# Patient Record
Sex: MALE | Race: WHITE | NOT HISPANIC OR LATINO | URBAN - METROPOLITAN AREA
[De-identification: names, ages, dates, MRNs, and addresses within clinical notes are randomized per-mention and may not be internally consistent; named-entity substitution may affect disease eponyms.]

---

## 2018-04-25 ENCOUNTER — EMERGENCY (EMERGENCY)
Facility: HOSPITAL | Age: 38
LOS: 1 days | Discharge: HOME | End: 2018-04-25
Admitting: EMERGENCY MEDICINE
Payer: COMMERCIAL

## 2018-04-25 VITALS
RESPIRATION RATE: 18 BRPM | SYSTOLIC BLOOD PRESSURE: 119 MMHG | DIASTOLIC BLOOD PRESSURE: 84 MMHG | HEART RATE: 84 BPM | OXYGEN SATURATION: 97 % | TEMPERATURE: 98 F
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
## 2018-04-25 DIAGNOSIS — M79.601 PAIN IN RIGHT ARM: ICD-10-CM

## 2018-04-25 DIAGNOSIS — M79.642 PAIN IN LEFT HAND: ICD-10-CM
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
## 2018-04-25 DIAGNOSIS — Z23 ENCOUNTER FOR IMMUNIZATION: ICD-10-CM
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
## 2018-04-25 PROCEDURE — 73564 X-RAY EXAM KNEE 4 OR MORE: CPT | Mod: 26,RT

## 2018-04-25 PROCEDURE — 73130 X-RAY EXAM OF HAND: CPT | Mod: 26,LT

## 2018-04-25 PROCEDURE — 99283 EMERGENCY DEPT VISIT LOW MDM: CPT

## 2018-04-25 PROCEDURE — 73080 X-RAY EXAM OF ELBOW: CPT | Mod: 26,RT

## 2018-04-25 RX ORDER — TETANUS TOXOID, REDUCED DIPHTHERIA TOXOID AND ACELLULAR PERTUSSIS VACCINE, ADSORBED 5; 2.5; 8; 8; 2.5 [IU]/.5ML; [IU]/.5ML; UG/.5ML; UG/.5ML; UG/.5ML
0.5 SUSPENSION INTRAMUSCULAR ONCE
Qty: 0 | Refills: 0 | Status: COMPLETED | OUTPATIENT
Start: 2018-04-25 | End: 2018-04-25

## 2018-04-25 RX ADMIN — TETANUS TOXOID, REDUCED DIPHTHERIA TOXOID AND ACELLULAR PERTUSSIS VACCINE, ADSORBED 0.5 MILLILITER(S): 5; 2.5; 8; 8; 2.5 SUSPENSION INTRAMUSCULAR at 20:30

## 2018-04-25 NOTE — ED PROVIDER NOTE - PHYSICAL EXAMINATION
R knee/R elbow: no erythema, edema, warmth, tenderness or sign of trauma. FROM, 5/5 strength, +2 pulses, sensation intact  L hand: no erythema, edema, warmth, tenderness or sign of trauma. FROM, 5/5 hand , finger warm and pink, <2 sec capillary refill ,+2 pulses, sensation intact    VITAL SIGNS: I have reviewed nursing notes and confirm.  CONSTITUTIONAL: Well-developed; well-nourished; in no acute distress.  SKIN: Skin is warm and dry, no acute rash.  HEAD: Normocephalic; atraumatic.  EYES: PERRL, EOM intact; conjunctiva and sclera clear.  ENT: No nasal discharge; airway clear.  NECK: Supple; non tender.  CARD: S1, S2 normal; no murmurs, gallops, or rubs. Regular rate and rhythm.  RESP: No wheezes, rales or rhonchi.  ABD: Normal bowel sounds; soft; non-distended; non-tender; no hepatosplenomegaly.  ALL OTHER EXT: Normal ROM. No clubbing, cyanosis or edema.  NEURO: Alert, oriented. Grossly unremarkable.  PSYCH: Cooperative, appropriate.
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
## 2018-04-25 NOTE — ED ADULT NURSE NOTE - CHPI ED SYMPTOMS NEG
no difficulty bearing weight/no headache/no crying/no decreased eating/drinking/no bruising/no dizziness/no disorientation/no fussiness

## 2020-12-08 ENCOUNTER — APPOINTMENT (OUTPATIENT)
Dept: OTOLARYNGOLOGY | Facility: CLINIC | Age: 40
End: 2020-12-08

## 2020-12-08 ENCOUNTER — APPOINTMENT (OUTPATIENT)
Dept: OTOLARYNGOLOGY | Facility: CLINIC | Age: 40
End: 2020-12-08
Payer: COMMERCIAL

## 2020-12-08 VITALS
SYSTOLIC BLOOD PRESSURE: 137 MMHG | WEIGHT: 189 LBS | HEART RATE: 71 BPM | HEIGHT: 67 IN | DIASTOLIC BLOOD PRESSURE: 87 MMHG | BODY MASS INDEX: 29.66 KG/M2 | TEMPERATURE: 97 F

## 2020-12-08 DIAGNOSIS — H61.21 IMPACTED CERUMEN, RIGHT EAR: ICD-10-CM

## 2020-12-08 PROBLEM — Z00.00 ENCOUNTER FOR PREVENTIVE HEALTH EXAMINATION: Status: ACTIVE | Noted: 2020-12-08

## 2020-12-08 PROCEDURE — 99204 OFFICE O/P NEW MOD 45 MIN: CPT | Mod: 25

## 2020-12-08 PROCEDURE — 69210 REMOVE IMPACTED EAR WAX UNI: CPT

## 2020-12-08 PROCEDURE — 92557 COMPREHENSIVE HEARING TEST: CPT

## 2020-12-08 PROCEDURE — 92550 TYMPANOMETRY & REFLEX THRESH: CPT

## 2020-12-08 PROCEDURE — 99072 ADDL SUPL MATRL&STAF TM PHE: CPT

## 2020-12-08 NOTE — PHYSICAL EXAM
[FreeTextEntry1] : Ad: EAC w thick cerumen removed w angled curet. TM intact and mobile, ME clear\par As: EAC clear and dry. TM intact, thickened and opaque, immobile. Cannot see into ME. [Midline] : trachea located in midline position [Normal] : no rashes

## 2020-12-08 NOTE — ASSESSMENT
[FreeTextEntry1] : 40M here for initial evaluation. For the past 6-7 years, he c/o constellation of recurrent sx involving his left ear which include sensation there is water stuck with clogged ears, pressure and diminished hearing. These vary in severity. Two weeks ago he had bloody drainage from the left ear which got better. He has been treated in the past with various oral and topical antibiotics with mild improvement in sx. Audiogram from today shows normal right sided hearing and a mild left conductive hearing loss. On exam, cerumen was removed from the right EAC. The left TM is intact, thickened and opaque and immobile. The rest of the head and neck exam is unremarkable.\par He has chronic left otitis media and mild conductive hearing loss on audiometry at all frequencies. Will send for CT temporal bone as next step and RTO thereafter to discuss plan.

## 2020-12-08 NOTE — HISTORY OF PRESENT ILLNESS
[de-identified] : 40M here for initial evaluation.\par \par For the past 6-7 years, he c/o constellation of recurrent sx involving his left ear which include sensation there is water stuck with clogged ears, pressure and diminished hearing. These vary in severity. Two weeks ago he had bloody drainage from the left ear which got better. He has been treated in the past with various oral and topical antibiotics with mild improvement in sx.\par \par Audiogram from today:\par R ear: hearing wnl. SRT 20, 100% discrim, type A \par L ear: mild CHL. SRT 40, 100% discrim, type A\par \par ROS otherwise unremarkable.

## 2020-12-24 ENCOUNTER — APPOINTMENT (OUTPATIENT)
Dept: CT IMAGING | Facility: HOSPITAL | Age: 40
End: 2020-12-24

## 2021-01-05 ENCOUNTER — APPOINTMENT (OUTPATIENT)
Dept: OTOLARYNGOLOGY | Facility: CLINIC | Age: 41
End: 2021-01-05

## 2021-02-23 ENCOUNTER — APPOINTMENT (OUTPATIENT)
Dept: OTOLARYNGOLOGY | Facility: CLINIC | Age: 41
End: 2021-02-23
Payer: COMMERCIAL

## 2021-02-23 PROCEDURE — 99072 ADDL SUPL MATRL&STAF TM PHE: CPT

## 2021-02-23 PROCEDURE — 99213 OFFICE O/P EST LOW 20 MIN: CPT

## 2021-02-23 NOTE — ASSESSMENT
[FreeTextEntry1] : 40M here in followup. For the past 6-7 years, he c/o constellation of recurrent sx involving his left ear which include sensation there is water stuck with clogged ears, pressure and diminished hearing. These vary in severity. He has also had rare episodes of bloody drainage. He has been treated in the past with various oral and topical antibiotics with mild improvement in sx.  Audiogram shows normal right sided hearing and a mild left conductive hearing loss. CT temporal bone shows abnormal soft tissue filling the left middle ear cavity w ossicular erosion and mastoid opacification and is c/w cholesteatoma. On exam, there is mucoid drainage in the left EAC which was suctioned; there is area of the posterior EAC w erosion and keratinous debris extending towards the attic area w retraction and bony erosion; the TM anteromedially is intact, but ME is opacified.\par He likely has chronic left otitis media with cholesteatoma with mild conductive hearing loss on audiometry at all frequencies. I will send to my otology colleague Dr. Ding for definitive management. Pt instructed to bring the CD with his images to his appointment.

## 2021-02-23 NOTE — PHYSICAL EXAM
[Midline] : trachea located in midline position [Normal] : no rashes [FreeTextEntry1] : Ad: EAC clear. TM intact and mobile, ME clear\par As: EAC w mucoid drainage suctioned; area of posterior canal w erosion and keratinous debris extending towards attic area; TM anteromedially intact, but ME opacified

## 2021-02-23 NOTE — HISTORY OF PRESENT ILLNESS
[de-identified] : 40M here in followup.\par \par For the past 6-7 years, he c/o constellation of recurrent sx involving his left ear which include sensation there is water stuck with clogged ears, pressure and diminished hearing. These vary in severity. He has also had rare episodes of bloody drainage. He has been treated in the past with various oral and topical antibiotics with mild improvement in sx.\par \par Audiogram from 12/2020:\par R ear: hearing wnl. SRT 20, 100% discrim, type A \par L ear: mild CHL. SRT 40, 100% discrim, type A\par \par CT temporal bone 2/9/21:\par -abnormal soft tissue filling middle ear cavity w ossicular erosion and mastoid opacification\par \par ROS otherwise unremarkable.

## 2021-02-28 ENCOUNTER — NON-APPOINTMENT (OUTPATIENT)
Age: 41
End: 2021-02-28

## 2021-03-01 ENCOUNTER — APPOINTMENT (OUTPATIENT)
Dept: OTOLARYNGOLOGY | Facility: CLINIC | Age: 41
End: 2021-03-01
Payer: COMMERCIAL

## 2021-03-01 VITALS — HEIGHT: 67 IN | TEMPERATURE: 98 F | WEIGHT: 188 LBS | BODY MASS INDEX: 29.51 KG/M2

## 2021-03-01 DIAGNOSIS — Z78.9 OTHER SPECIFIED HEALTH STATUS: ICD-10-CM

## 2021-03-01 DIAGNOSIS — F17.200 NICOTINE DEPENDENCE, UNSPECIFIED, UNCOMPLICATED: ICD-10-CM

## 2021-03-01 PROCEDURE — 99072 ADDL SUPL MATRL&STAF TM PHE: CPT

## 2021-03-01 PROCEDURE — 31231 NASAL ENDOSCOPY DX: CPT

## 2021-03-01 PROCEDURE — 69210 REMOVE IMPACTED EAR WAX UNI: CPT

## 2021-03-01 PROCEDURE — 99215 OFFICE O/P EST HI 40 MIN: CPT | Mod: 25

## 2021-03-01 NOTE — PROCEDURE
[FreeTextEntry6] : PROCEDURE:  NASAL ENDOSCOPY  \par \par Surgeon: Dr. Ding\par Indication: Chronic Rhinitis\par Anesthetic: Topical lidocaine and Afrin\par Procedure: The patient was placed in a sitting position.  Following application of the topical anesthetic and decongestant, exam was performed with a flexible endoscope.  The following anatomic sites were directly examined bilaterally in a sequential fashion:\par The scope was introduced in the nasal passage between the middle and inferior turbinates to exam the inferior portion of the middle meatus and the fontanelle, as well as the maxillary ostia. Next, the scope was passed medially and posteriorly to the middle turbinates to examine the sphenoethmoid recess and the superior turbinate region.\par \par Nasopharynx: no masses, choanae patent, no adenoid tissue\par \par The following findings were noted:\par \par mild turbinate hypertrophy. No nasopharyngeal mass. The eustachian tube orifice was patent.

## 2021-03-01 NOTE — ASSESSMENT
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 CT images suggest imminent threat to the middle cranial fossa, the semicircular canal, and the facial nerve. I have reviewed this with the patient in detail.\par \par I have recommended surgical intervention. Management options carefully reviewed in detail.All risks limitations, complications and alternatives reviewed in detail.  Questions answered.\par \par Surgical plan: Left tympanomastoidectomy, cartilage graft, possible canal down, skin graft, facial nerve decompression

## 2021-03-01 NOTE — PHYSICAL EXAM
[FreeTextEntry1] : Microscopic ear exam with cerumen debridement:\par \par Right ear: The ear canal was patent and nonobstructed.  The tympanic membrane was intact and noninflamed.\par \par Left ear: Obstructing cerumen was debrided from the ear canal using suction.  The ear canal was within normal limits. Pars flaccida retraction with purulence and keratin retention debrided with suction. A posterior superior quadrant retraction is also noted. [Nasal Endoscopy Performed] : nasal endoscopy was performed, see procedure section for findings [Midline] : trachea located in midline position [Normal] : no rashes

## 2021-03-01 NOTE — HISTORY OF PRESENT ILLNESS
[de-identified] : HIREN CHEATHAM has a history of hearing loss in the left ear for several years.  Ear fullness and hearing loss since 2015.  Intermittent otorrhea since about 2019. This has increased.  No significant pain.  No vertigo.\par \par No prior ear disease.  Denies sino nasal disease.

## 2021-03-01 NOTE — DATA REVIEWED
[de-identified] : audiometry from December reviewed with the patient in detail [de-identified] : CT scan images reviewed with the patient in detail

## 2021-03-23 ENCOUNTER — LABORATORY RESULT (OUTPATIENT)
Age: 41
End: 2021-03-23

## 2021-03-25 ENCOUNTER — TRANSCRIPTION ENCOUNTER (OUTPATIENT)
Age: 41
End: 2021-03-25

## 2021-03-26 ENCOUNTER — RESULT REVIEW (OUTPATIENT)
Age: 41
End: 2021-03-26

## 2021-03-26 ENCOUNTER — OUTPATIENT (OUTPATIENT)
Dept: OUTPATIENT SERVICES | Facility: HOSPITAL | Age: 41
LOS: 1 days | Discharge: HOME | End: 2021-03-26
Payer: COMMERCIAL

## 2021-03-26 ENCOUNTER — APPOINTMENT (OUTPATIENT)
Dept: OTOLARYNGOLOGY | Facility: AMBULATORY SURGERY CENTER | Age: 41
End: 2021-03-26

## 2021-03-26 PROCEDURE — 69646 REVISE MIDDLE EAR & MASTOID: CPT | Mod: LT

## 2021-03-26 PROCEDURE — 15120 SPLT AGRFT F/S/N/H/F/G/M 1ST: CPT | Mod: LT

## 2021-03-26 PROCEDURE — 21235 EAR CARTILAGE GRAFT: CPT | Mod: LT

## 2021-03-26 PROCEDURE — 88304 TISSUE EXAM BY PATHOLOGIST: CPT | Mod: 26

## 2021-03-26 PROCEDURE — 95867 NDL EMG CRANIAL NRV MUSC UNI: CPT | Mod: 26

## 2021-03-30 ENCOUNTER — APPOINTMENT (OUTPATIENT)
Dept: OTOLARYNGOLOGY | Facility: CLINIC | Age: 41
End: 2021-03-30
Payer: COMMERCIAL

## 2021-03-30 VITALS — WEIGHT: 188 LBS | HEIGHT: 67 IN | BODY MASS INDEX: 29.51 KG/M2 | TEMPERATURE: 96.2 F

## 2021-03-30 PROCEDURE — 99024 POSTOP FOLLOW-UP VISIT: CPT

## 2021-03-30 NOTE — REASON FOR VISIT
[Post-Operative Visit] : a post-operative visit [FreeTextEntry2] : s/p Left ear tympanomastoidectomy

## 2021-03-30 NOTE — HISTORY OF PRESENT ILLNESS
[FreeTextEntry1] : 03/30/2021 \par History\par Post operative visit.\par Reports mild pain.  No significant tinnitus.  No significant vertigo.  No bleeding reported.\par compliant with wound care\par \par Physical Exam\par \par Face: Normal Function bilaterally\par \par Neck: No mass, Full range of motion\par \par \par Microscopic Ear Exam\par Left Ear:  [place] incision intact, not inflammed meatal stent removed. Clean gelfoam fills the ear canal. No significant inflammation noted. gentamicin ointment instilled\par \par [place] Hearing Assessment\par 512 Hz:\par Fuentes test: referred to the left ear\par \par Postoperative instructions reviewed with the patient in detail. Followup plans discussed.\par \par  [de-identified] : HIREN CHEATHAM has a history of hearing loss in the left ear for several years.  Ear fullness and hearing loss since 2015.  Intermittent otorrhea since about 2019. This has increased.  No significant pain.  No vertigo.\par \par No prior ear disease.  Denies sino nasal disease.

## 2021-04-26 ENCOUNTER — APPOINTMENT (OUTPATIENT)
Dept: OTOLARYNGOLOGY | Facility: CLINIC | Age: 41
End: 2021-04-26
Payer: COMMERCIAL

## 2021-04-26 PROCEDURE — 99024 POSTOP FOLLOW-UP VISIT: CPT

## 2021-04-26 PROCEDURE — 92557 COMPREHENSIVE HEARING TEST: CPT

## 2021-04-26 PROCEDURE — 92567 TYMPANOMETRY: CPT

## 2021-04-26 RX ORDER — CEFADROXIL 500 MG/1
500 CAPSULE ORAL TWICE DAILY
Qty: 10 | Refills: 1 | Status: DISCONTINUED | COMMUNITY
Start: 2021-03-26 | End: 2021-04-26

## 2021-04-26 RX ORDER — ACETAMINOPHEN AND CODEINE 300; 30 MG/1; MG/1
300-30 TABLET ORAL
Qty: 20 | Refills: 0 | Status: DISCONTINUED | COMMUNITY
Start: 2021-03-26 | End: 2021-04-26

## 2021-04-26 RX ORDER — CIPROFLOXACIN AND DEXAMETHASONE 3; 1 MG/ML; MG/ML
0.3-0.1 SUSPENSION/ DROPS AURICULAR (OTIC) TWICE DAILY
Qty: 1 | Refills: 5 | Status: DISCONTINUED | COMMUNITY
Start: 2021-03-26 | End: 2021-04-26

## 2021-04-26 RX ORDER — CIPROFLOXACIN AND DEXAMETHASONE 3; 1 MG/ML; MG/ML
0.3-0.1 SUSPENSION/ DROPS AURICULAR (OTIC) TWICE DAILY
Qty: 1 | Refills: 1 | Status: DISCONTINUED | COMMUNITY
Start: 2021-03-01 | End: 2021-04-26

## 2021-04-26 NOTE — HISTORY OF PRESENT ILLNESS
[de-identified] : HIREN CHEATHAM has a history of hearing loss in the left ear for several years.  Ear fullness and hearing loss since 2015.  Intermittent otorrhea since about 2019. This has increased.  No significant pain.  No vertigo.\par \par No prior ear disease. \par March 26, 2021: Left canal wall down inside-out. Tympanomastoidectomy. Large cholesteatoma with eroded stapes and incus [FreeTextEntry1] : 04/26/2021 \par History\par Post operative visit.\par Reports No pain or otorrhea. Compliant with wound care\par \par Physical Exam\par \par Microscopic Ear Exam\par Left Ear:  Residual clean gelfoam partially debrided.  No significant inflammation noted.  The tympanic membrane appears intact. cavity is noninflamed.\par \par Complete audiometry was ordered and completed today. This was separately reported by the audiologist. The results were reviewed in detail with the patient.\par \par \par \par Postoperative instructions reviewed with the patient in detail. Followup plans discussed.\par

## 2021-05-10 ENCOUNTER — APPOINTMENT (OUTPATIENT)
Dept: OTOLARYNGOLOGY | Facility: CLINIC | Age: 41
End: 2021-05-10
Payer: COMMERCIAL

## 2021-05-10 VITALS — HEIGHT: 67 IN | TEMPERATURE: 97.3 F | WEIGHT: 190 LBS | BODY MASS INDEX: 29.82 KG/M2

## 2021-05-10 DIAGNOSIS — Z48.89 ENCOUNTER FOR OTHER SPECIFIED SURGICAL AFTERCARE: ICD-10-CM

## 2021-05-10 PROCEDURE — 92557 COMPREHENSIVE HEARING TEST: CPT

## 2021-05-10 PROCEDURE — 92567 TYMPANOMETRY: CPT

## 2021-05-10 PROCEDURE — 99024 POSTOP FOLLOW-UP VISIT: CPT

## 2021-05-10 NOTE — HISTORY OF PRESENT ILLNESS
[de-identified] : HIREN CHEATHAM has a history of hearing loss in the left ear for several years.  Ear fullness and hearing loss since 2015.  Intermittent otorrhea since about 2019. This has increased.  No significant pain.  No vertigo.\par \par No prior ear disease. \par March 26, 2021: Left canal wall down inside-out. Tympanomastoidectomy. Large cholesteatoma with eroded stapes and incus [FreeTextEntry1] : History\par Post operative visit.\par Reports No pain or otorrhea. Compliant with wound care\par \par Physical Exam\par \par Microscopic Ear Exam\par Left Ear:  Residual clean gelfoam partially debrided.  No significant inflammation noted.  The tympanic membrane appears intact. Cavity is clean/\par \par Complete audiometry was ordered and completed today. This was separately reported by the audiologist. The results were reviewed in detail with the patient.\par \par \par \par Postoperative instructions reviewed with the patient in detail. Followup plans discussed.

## 2021-08-17 ENCOUNTER — APPOINTMENT (OUTPATIENT)
Dept: OTOLARYNGOLOGY | Facility: CLINIC | Age: 41
End: 2021-08-17

## 2021-08-30 ENCOUNTER — APPOINTMENT (OUTPATIENT)
Dept: OTOLARYNGOLOGY | Facility: CLINIC | Age: 41
End: 2021-08-30
Payer: COMMERCIAL

## 2021-08-30 VITALS — HEIGHT: 67 IN | BODY MASS INDEX: 29.03 KG/M2 | TEMPERATURE: 97.3 F | WEIGHT: 185 LBS

## 2021-08-30 DIAGNOSIS — H65.492 OTHER CHRONIC NONSUPPURATIVE OTITIS MEDIA, LEFT EAR: ICD-10-CM

## 2021-08-30 PROCEDURE — 99213 OFFICE O/P EST LOW 20 MIN: CPT | Mod: 25

## 2021-08-30 PROCEDURE — 92550 TYMPANOMETRY & REFLEX THRESH: CPT

## 2021-08-30 PROCEDURE — 92557 COMPREHENSIVE HEARING TEST: CPT

## 2021-08-30 PROCEDURE — 69220 CLEAN OUT MASTOID CAVITY: CPT

## 2021-08-30 RX ORDER — OFLOXACIN OTIC 3 MG/ML
0.3 SOLUTION AURICULAR (OTIC) TWICE DAILY
Qty: 1 | Refills: 2 | Status: ACTIVE | COMMUNITY
Start: 2021-08-30 | End: 1900-01-01

## 2021-08-30 NOTE — HISTORY OF PRESENT ILLNESS
[de-identified] : HIREN CHEATHAM has a history of hearing loss in the left ear for several years.  Ear fullness and hearing loss since 2015.  Intermittent otorrhea since about 2019. This has increased.  No significant pain.  No vertigo.\par \par No prior ear disease. \par March 26, 2021: Left canal wall down inside-out. Tympanomastoidectomy. Large cholesteatoma with eroded stapes and incus [FreeTextEntry1] : 08/30/2021 \par Patient reports of some discharge from the left ear for a few weeks. No ear pain.  Itching reported.  Compliant with water protection.

## 2021-08-30 NOTE — DATA REVIEWED
[de-identified] : In order to investigate current symptoms, Complete audiometry was ordered and completed today. I have interpreted these results and reviewed them in detail with the patient.\par \par conductive hearing loss, left ear

## 2021-08-30 NOTE — ASSESSMENT
[FreeTextEntry1] : Moderately active inflammation of the left mastoid treated today with debridement and topical. Gentamicin ointment. Wound instructions reviewed in detail.\par \par All recommended in 3 months. Further consideration for hearing restoration surgery was discussed with the patient.

## 2021-08-30 NOTE — PHYSICAL EXAM
[Normal] : temporomandibular joint is normal [FreeTextEntry1] : Microscopic ear exam with Mastoid debridement:\par \par Right ear: The ear canal was patent and nonobstructed.  The tympanic membrane was intact and noninflamed.\par \par Left ear: \par Canal down cavity.\par Retained keratin and cerumen debrided with a curet and alligator forcep and suction.\par Mild underlying purulence and granulation. The tympanic membrane appears to be intact.

## 2021-12-20 ENCOUNTER — APPOINTMENT (OUTPATIENT)
Dept: OTOLARYNGOLOGY | Facility: CLINIC | Age: 41
End: 2021-12-20
Payer: COMMERCIAL

## 2021-12-20 PROCEDURE — 99213 OFFICE O/P EST LOW 20 MIN: CPT | Mod: 25

## 2021-12-20 PROCEDURE — 69220 CLEAN OUT MASTOID CAVITY: CPT

## 2021-12-20 NOTE — HISTORY OF PRESENT ILLNESS
[de-identified] : HIREN CHEATHAM has a history of hearing loss in the left ear for several years.  Ear fullness and hearing loss since 2015.  Intermittent otorrhea since about 2019. This has increased.  No significant pain.  No vertigo.\par \par No prior ear disease. \par March 26, 2021: Left canal wall down inside-out. Tympanomastoidectomy. Large cholesteatoma with eroded stapes and incus [FreeTextEntry1] : 12/20/2021 \par No ear pain. occasional mild otorrhea. hearing is somewhat better.

## 2021-12-20 NOTE — REASON FOR VISIT
[Subsequent Evaluation] : a subsequent evaluation for [FreeTextEntry2] : s/p left tympanomastoidectomy

## 2021-12-20 NOTE — ASSESSMENT
[FreeTextEntry1] : Improved cavity healing following left mastoid surgery. Mild inflammation treated today with debridement and topical CSF powder.\par \par Continued clinical monitoring recommended with reevaluation in 4-6 months. Audiometry will be repeated.

## 2021-12-20 NOTE — PHYSICAL EXAM
[Normal] : temporomandibular joint is normal [FreeTextEntry1] : Microscopic ear exam with Mastoid debridement:\par \par Right ear: The ear canal was patent and nonobstructed.  The tympanic membrane was intact and noninflamed.\par \par Left ear: \par Canal down cavity.\par Mild Retained keratin and cerumen debrided with a curet and suction.\par Mild underlying purulence and granulation. The tympanic membrane appears to be intact.\par

## 2023-08-09 ENCOUNTER — APPOINTMENT (OUTPATIENT)
Dept: OTOLARYNGOLOGY | Facility: CLINIC | Age: 43
End: 2023-08-09
Payer: COMMERCIAL

## 2023-08-09 VITALS — BODY MASS INDEX: 26.52 KG/M2 | HEIGHT: 68 IN | WEIGHT: 175 LBS

## 2023-08-09 PROCEDURE — 92567 TYMPANOMETRY: CPT | Mod: NC

## 2023-08-09 PROCEDURE — 99214 OFFICE O/P EST MOD 30 MIN: CPT | Mod: 25

## 2023-08-09 PROCEDURE — 92557 COMPREHENSIVE HEARING TEST: CPT | Mod: NC

## 2023-08-09 PROCEDURE — 69220 CLEAN OUT MASTOID CAVITY: CPT

## 2023-08-09 RX ORDER — CIPROFLOXACIN AND DEXAMETHASONE 3; 1 MG/ML; MG/ML
0.3-0.1 SUSPENSION/ DROPS AURICULAR (OTIC) TWICE DAILY
Qty: 1 | Refills: 1 | Status: ACTIVE | COMMUNITY
Start: 2023-08-09 | End: 1900-01-01

## 2023-08-09 NOTE — HISTORY OF PRESENT ILLNESS
[de-identified] : HIREN CHEATHAM has a history of hearing loss in the left ear for several years. Ear fullness and hearing loss since 2015.   No prior ear disease.  March 26, 2021: Left canal wall down inside-out. Tympanomastoidectomy. Large cholesteatoma with eroded stapes and incus  [FreeTextEntry1] : 08/09/2023  Recurrent otorrhea and moist sensation in the ear for several weeks.  Incomplete compliance with water protection.  The patient has also not been compliant with follow-up appointments.  Hearing loss persists.  No perceived progression.

## 2023-08-09 NOTE — ASSESSMENT
[FreeTextEntry1] : Acute inflammation of the left ear/mastoid cavity.  This may be due to water contamination.  Poor compliance with follow-up.  I have discussed this with the patient in detail.  I have encouraged him to be consistent with follow-up for mastoid maintenance and cholesteatoma surveillance.  Otic drops recommended. Water protection recommended. Follow-up recommended in 3 to 4 weeks.  hearing loss management was briefly discussed and will be considered to further in future visits.

## 2023-08-09 NOTE — DATA REVIEWED
[de-identified] : In light of the patients current symptoms, Complete audiometry was ordered and completed today. I have interpreted these results and reviewed them in detail with the patient.  Moderate to severe conductive hearing loss left ear.  Abnormal tympanometry left ear.

## 2023-09-11 ENCOUNTER — APPOINTMENT (OUTPATIENT)
Dept: OTOLARYNGOLOGY | Facility: CLINIC | Age: 43
End: 2023-09-11
Payer: COMMERCIAL

## 2023-09-11 VITALS — HEIGHT: 68 IN | WEIGHT: 175 LBS | BODY MASS INDEX: 26.52 KG/M2

## 2023-09-11 DIAGNOSIS — B36.9 SUPERFICIAL MYCOSIS, UNSPECIFIED: ICD-10-CM

## 2023-09-11 DIAGNOSIS — H62.42 SUPERFICIAL MYCOSIS, UNSPECIFIED: ICD-10-CM

## 2023-09-11 PROCEDURE — 99214 OFFICE O/P EST MOD 30 MIN: CPT | Mod: 25

## 2023-09-11 PROCEDURE — 69220 CLEAN OUT MASTOID CAVITY: CPT

## 2023-09-11 RX ORDER — CLOTRIMAZOLE 10 MG/ML
1 SOLUTION TOPICAL
Qty: 30 | Refills: 0 | Status: ACTIVE | COMMUNITY
Start: 2023-09-11 | End: 1900-01-01

## 2024-04-24 ENCOUNTER — APPOINTMENT (OUTPATIENT)
Dept: OTOLARYNGOLOGY | Facility: CLINIC | Age: 44
End: 2024-04-24
Payer: COMMERCIAL

## 2024-04-24 DIAGNOSIS — H93.299 OTHER ABNORMAL AUDITORY PERCEPTIONS, UNSPECIFIED EAR: ICD-10-CM

## 2024-04-24 DIAGNOSIS — H90.6 MIXED CONDUCTIVE AND SENSORINEURAL HEARING LOSS, BILATERAL: ICD-10-CM

## 2024-04-24 PROCEDURE — 92557 COMPREHENSIVE HEARING TEST: CPT

## 2024-04-24 PROCEDURE — 99214 OFFICE O/P EST MOD 30 MIN: CPT | Mod: 25

## 2024-04-24 PROCEDURE — 92550 TYMPANOMETRY & REFLEX THRESH: CPT | Mod: 52

## 2024-04-24 PROCEDURE — 69420 INCISION OF EARDRUM: CPT | Mod: RT

## 2024-04-24 PROCEDURE — 69220 CLEAN OUT MASTOID CAVITY: CPT | Mod: LT

## 2024-04-24 RX ORDER — OFLOXACIN OTIC 3 MG/ML
0.3 SOLUTION AURICULAR (OTIC)
Qty: 1 | Refills: 1 | Status: ACTIVE | COMMUNITY
Start: 2024-04-24 | End: 1900-01-01

## 2024-04-24 NOTE — PROCEDURE
[FreeTextEntry3] : Procedure: Right Myringotomy  Indication: Otitis Media  Management options reviewed in detail.  All risks limitations complications and alternatives reviewed with the patient who agreed. Anesthesia:topical phenol  Findings: serous effusion Complications: None Estimated Blood Loss: None

## 2024-04-24 NOTE — PHYSICAL EXAM
[Normal] : temporomandibular joint is normal [FreeTextEntry1] : Microscopic ear exam with mastoid debridement:  Right ear: The ear canal was patent and nonobstructed.  The tympanic membrane was intact and noninflamed.Middle ear filled with effusion.  No inflammation.  Left ear:Canal down cavity.  Mild retention of purulence.  Culture was taken prior to debridement.  This was debrided with suction.  Minimal canal/cavity inflammation identified.  This ear was treated with topical CSF powder

## 2024-04-24 NOTE — HISTORY OF PRESENT ILLNESS
[de-identified] : HIREN CHEATHAM has a history of hearing loss in the left ear for several years. Ear fullness and hearing loss since 2015. No prior ear disease.  March 26, 2021: Left canal wall down inside-out. Tympanomastoidectomy. Large cholesteatoma with eroded stapes and incus   [FreeTextEntry1] : 04/24/2024  Recent onset of head pressure bilaterally for several weeks.  Treated in the ED; CT reportedly wnl. Left ear otorrhea. uncertain whether the prior tx was helpful.

## 2024-04-24 NOTE — DATA REVIEWED
[de-identified] : In light of the patients current symptoms, Complete audiometry was ordered and completed today. I have interpreted these results and reviewed them in detail with the patient.  Mixed hearing loss affecting the left ear greater than the right.  Abnormal tympanometry bilaterally

## 2024-04-24 NOTE — ASSESSMENT
[FreeTextEntry1] : New onset otitis media with effusion in the better hearing right ear.  This has been present for at least 4 weeks and has not resolved.  We discussed management options.  He wished to proceed with right myringotomy which was achieved today.  Wound instructions reviewed including use of otic drops if otorrhea occurs.  Mild mastoid inflammation treated today with culture and debridement.  CSF powder placed.  Follow-up recommended in 1 month.

## 2024-04-30 LAB — EAR NOSE AND THROAT CULTURE: ABNORMAL

## 2024-05-29 ENCOUNTER — APPOINTMENT (OUTPATIENT)
Dept: OTOLARYNGOLOGY | Facility: CLINIC | Age: 44
End: 2024-05-29
Payer: COMMERCIAL

## 2024-05-29 VITALS — BODY MASS INDEX: 26.52 KG/M2 | WEIGHT: 175 LBS | HEIGHT: 68 IN

## 2024-05-29 DIAGNOSIS — H71.92 UNSPECIFIED CHOLESTEATOMA, LEFT EAR: ICD-10-CM

## 2024-05-29 DIAGNOSIS — H90.12 CONDUCTIVE HEARING LOSS, UNILATERAL, LEFT EAR, WITH UNRESTRICTED HEARING ON THE CONTRALATERAL SIDE: ICD-10-CM

## 2024-05-29 DIAGNOSIS — H65.21 CHRONIC SEROUS OTITIS MEDIA, RIGHT EAR: ICD-10-CM

## 2024-05-29 DIAGNOSIS — H95.199 OTHER DISORDERS FOLLOWING MASTOIDECTOMY, UNSPECIFIED EAR: ICD-10-CM

## 2024-05-29 PROCEDURE — 92567 TYMPANOMETRY: CPT

## 2024-05-29 PROCEDURE — 92557 COMPREHENSIVE HEARING TEST: CPT

## 2024-05-29 PROCEDURE — 99214 OFFICE O/P EST MOD 30 MIN: CPT | Mod: 25

## 2024-05-29 PROCEDURE — 69220 CLEAN OUT MASTOID CAVITY: CPT

## 2024-05-29 RX ORDER — TOBRAMYCIN AND DEXAMETHASONE 3; 1 MG/ML; MG/ML
0.3-0.1 SUSPENSION/ DROPS OPHTHALMIC
Qty: 1 | Refills: 3 | Status: ACTIVE | COMMUNITY
Start: 2024-05-29 | End: 1900-01-01

## 2024-05-29 NOTE — HISTORY OF PRESENT ILLNESS
[de-identified] : HIREN CHEATHAM has a history of hearing loss in the left ear for several years. Ear fullness and hearing loss since 2015. No prior ear disease.  March 26, 2021: Left canal wall down inside-out. Tympanomastoidectomy. Large cholesteatoma with eroded stapes and incus   [FreeTextEntry1] : 5/29/2024: Improved hearing in the right ear.  No right otorrhea reported.  Intermittent otorrhea from the left ear reported.  Not currently using otic drops.

## 2024-05-29 NOTE — ASSESSMENT
[FreeTextEntry1] : Resolved otitis media in the better hearing right ear.  Continued inflammation of the left ear.  The patient has been mostly noncompliant with the use of otic drops in the left ear.  I reinforced the need for water protection.  I have recommended twice weekly otic drops in the left ear.  I have treated him today with debridement and topical mupirocin ointment.  Prior culture was intermediate resistance to Cipro

## 2024-05-29 NOTE — PHYSICAL EXAM
[Normal] : temporomandibular joint is normal [FreeTextEntry1] : Microscopic ear exam with mastoid debridement:  Right ear: The ear canal was patent and nonobstructed.  The tympanic membrane was intact and noninflamed.  No effusion.  Left ear:Canal down cavity.  Mild retention of purulence.  Culture was taken prior to debridement.  This was debrided with suction.  Minimal canal/cavity inflammation identified.  This ear was treated with topical CSF powder

## 2024-05-29 NOTE — DATA REVIEWED
[de-identified] : In light of the patients current symptoms, Complete audiometry was ordered and completed today. I have interpreted these results and reviewed them in detail with the patient.  Improved hearing in the right ear.  Moderate conductive hearing loss left ear.

## 2024-05-29 NOTE — REASON FOR VISIT
[Subsequent Evaluation] : a subsequent evaluation for [Hearing Loss] : hearing loss [FreeTextEntry2] : ear infection

## 2024-05-31 PROBLEM — H90.12 CONDUCTIVE HEARING LOSS OF LEFT EAR WITH UNRESTRICTED HEARING OF RIGHT EAR: Status: ACTIVE | Noted: 2021-05-10

## 2024-08-14 ENCOUNTER — APPOINTMENT (OUTPATIENT)
Dept: OTOLARYNGOLOGY | Facility: CLINIC | Age: 44
End: 2024-08-14
Payer: COMMERCIAL

## 2024-08-14 VITALS — HEIGHT: 68 IN | WEIGHT: 175 LBS | BODY MASS INDEX: 26.52 KG/M2

## 2024-08-14 DIAGNOSIS — H90.12 CONDUCTIVE HEARING LOSS, UNILATERAL, LEFT EAR, WITH UNRESTRICTED HEARING ON THE CONTRALATERAL SIDE: ICD-10-CM

## 2024-08-14 DIAGNOSIS — H71.92 UNSPECIFIED CHOLESTEATOMA, LEFT EAR: ICD-10-CM

## 2024-08-14 DIAGNOSIS — H95.199 OTHER DISORDERS FOLLOWING MASTOIDECTOMY, UNSPECIFIED EAR: ICD-10-CM

## 2024-08-14 PROCEDURE — 99214 OFFICE O/P EST MOD 30 MIN: CPT | Mod: 25

## 2024-08-14 PROCEDURE — 69220 CLEAN OUT MASTOID CAVITY: CPT | Mod: LT

## 2024-09-18 ENCOUNTER — APPOINTMENT (OUTPATIENT)
Dept: CT IMAGING | Facility: CLINIC | Age: 44
End: 2024-09-18

## 2024-09-23 ENCOUNTER — RESULT REVIEW (OUTPATIENT)
Age: 44
End: 2024-09-23

## 2024-09-25 ENCOUNTER — OUTPATIENT (OUTPATIENT)
Dept: OUTPATIENT SERVICES | Facility: HOSPITAL | Age: 44
LOS: 1 days | End: 2024-09-25

## 2024-09-25 ENCOUNTER — APPOINTMENT (OUTPATIENT)
Dept: CT IMAGING | Facility: CLINIC | Age: 44
End: 2024-09-25
Payer: COMMERCIAL

## 2024-09-25 PROCEDURE — 70480 CT ORBIT/EAR/FOSSA W/O DYE: CPT | Mod: 26

## 2024-10-07 ENCOUNTER — APPOINTMENT (OUTPATIENT)
Dept: OTOLARYNGOLOGY | Facility: CLINIC | Age: 44
End: 2024-10-07
Payer: COMMERCIAL

## 2024-10-07 VITALS — HEIGHT: 68 IN | BODY MASS INDEX: 27.28 KG/M2 | WEIGHT: 180 LBS

## 2024-10-07 DIAGNOSIS — H71.92 UNSPECIFIED CHOLESTEATOMA, LEFT EAR: ICD-10-CM

## 2024-10-07 DIAGNOSIS — H95.199 OTHER DISORDERS FOLLOWING MASTOIDECTOMY, UNSPECIFIED EAR: ICD-10-CM

## 2024-10-07 PROCEDURE — 99214 OFFICE O/P EST MOD 30 MIN: CPT | Mod: 25

## 2024-10-07 PROCEDURE — 69220 CLEAN OUT MASTOID CAVITY: CPT | Mod: LT
